# Patient Record
Sex: MALE | Race: WHITE
[De-identification: names, ages, dates, MRNs, and addresses within clinical notes are randomized per-mention and may not be internally consistent; named-entity substitution may affect disease eponyms.]

---

## 2019-02-20 ENCOUNTER — HOSPITAL ENCOUNTER (OUTPATIENT)
Dept: HOSPITAL 92 - SDC | Age: 34
Discharge: HOME | End: 2019-02-20
Attending: ORTHOPAEDIC SURGERY
Payer: COMMERCIAL

## 2019-02-20 VITALS — BODY MASS INDEX: 21.9 KG/M2

## 2019-02-20 DIAGNOSIS — M24.541: ICD-10-CM

## 2019-02-20 DIAGNOSIS — T84.84XA: Primary | ICD-10-CM

## 2019-02-20 DIAGNOSIS — M79.2: ICD-10-CM

## 2019-02-20 LAB
BASOPHILS # BLD AUTO: 0.1 THOU/UL (ref 0–0.2)
BASOPHILS NFR BLD AUTO: 1.4 % (ref 0–1)
EOSINOPHIL # BLD AUTO: 0.1 THOU/UL (ref 0–0.7)
EOSINOPHIL NFR BLD AUTO: 1.7 % (ref 0–10)
HGB BLD-MCNC: 15.2 G/DL (ref 14–18)
LYMPHOCYTES # BLD: 1.9 THOU/UL (ref 1.2–3.4)
LYMPHOCYTES NFR BLD AUTO: 37.7 % (ref 21–51)
MCH RBC QN AUTO: 31.9 PG (ref 27–31)
MCV RBC AUTO: 95.3 FL (ref 78–98)
MONOCYTES # BLD AUTO: 0.5 THOU/UL (ref 0.11–0.59)
MONOCYTES NFR BLD AUTO: 9.5 % (ref 0–10)
NEUTROPHILS # BLD AUTO: 2.5 THOU/UL (ref 1.4–6.5)
NEUTROPHILS NFR BLD AUTO: 49.8 % (ref 42–75)
PLATELET # BLD AUTO: 160 THOU/UL (ref 130–400)
RBC # BLD AUTO: 4.77 MILL/UL (ref 4.7–6.1)
WBC # BLD AUTO: 5 THOU/UL (ref 4.8–10.8)

## 2019-02-20 PROCEDURE — S0020 INJECTION, BUPIVICAINE HYDRO: HCPCS

## 2019-02-20 PROCEDURE — 76000 FLUOROSCOPY <1 HR PHYS/QHP: CPT

## 2019-02-20 PROCEDURE — 0RNWXZZ RELEASE RIGHT FINGER PHALANGEAL JOINT, EXTERNAL APPROACH: ICD-10-PCS | Performed by: ORTHOPAEDIC SURGERY

## 2019-02-20 PROCEDURE — 0RPW0JZ REMOVAL OF SYNTHETIC SUBSTITUTE FROM RIGHT FINGER PHALANGEAL JOINT, OPEN APPROACH: ICD-10-PCS | Performed by: ORTHOPAEDIC SURGERY

## 2019-02-20 PROCEDURE — 85652 RBC SED RATE AUTOMATED: CPT

## 2019-02-20 PROCEDURE — 85025 COMPLETE CBC W/AUTO DIFF WBC: CPT

## 2019-02-20 PROCEDURE — 36415 COLL VENOUS BLD VENIPUNCTURE: CPT

## 2019-02-20 NOTE — RAD
FLUOROSCOPIC IMAGES OF THE RIGHT RING FINGER:

2/20/19

 

INDICATION:

History of hardware removal. 

 

COMPARISON:  

Prior exam dated 11/16/18.

 

FINDINGS:  

Since the comparison study, there has been interval removal of the percutaneous pins involving the ri
ng finger proximal phalangeal neck fracture. Fracture still persists involving the ring finger proxim
al phalanx. The instrumented long finger proximal phalangeal base fracture is unchanged in position w
ith some interval healing. Dedicated right hand radiographs are recommended to evaluate the extent of
 healing.

 

IMPRESSION:  

Intraoperative C-arm images for removal of hardware from the right fourth digit proximal phalangeal f
racture. 

 

 

POS: INA

## 2019-02-21 NOTE — OP
DATE OF PROCEDURE:  02/20/2019



PREOPERATIVE DIAGNOSIS:  Right ring finger proximal phalanx neck subchondral

fracture, 3 months old, with painful now deep implant K-wires that are crossed. 



POSTOPERATIVE DIAGNOSES:  

1. Right ring finger proximal phalanx neck subchondral fracture, 3 months old, with

painful now deep implant K-wires that are crossed. 

2. Joint contracture.

3. No evidence of fracture; gross motion after wires removing, any plane and with

stretch. 



PROCEDURES PERFORMED:  

1. Manipulation of the joint from 110 degrees of flexion to -5 extension.

2. Removal of K-wires deep under general anesthesia using open incision.

3. C-arm supervision.



COMPLICATIONS:  None.



TOURNIQUET TIME:  8 minutes.



INDICATIONS:  The patient had a saw entered in both the long finger at the base of

the proximal phalanx and the ipsilateral right ring finger at the neck of the

proximal phalanx near the PIP joint. We then in order to maximize joint healing,

over reduced the joint part to make it anatomic approximately 0.5 mm wide for loss

of bone tissue. We crossed K-wires just because the fragment was fragile, and it has

now healed radiographically but, there is fear now that the joint is contracted with

loss of active flexion, and that if we remove the wires, the intra-articular portion

may not be healed and the fracture may move. Thus, a manipulation is indicated for

two reasons and the wire for one. 



DESCRIPTION OF PROCEDURE:  After successful general LMA technique, the limb was

prepped and draped. Time-out was done appropriately. We then gave the patient 10 mL

of 0.5% Marcaine block at metacarpophalangeal joint level. We then proceeded with

the limb exsanguination and tourniquet to 250 mmHg pressure was maximized. We

brought the C-arm to the field, identified the  __________. First, we made a 5-mm

incision, over dissected out both K-wires and removed them with a heavy needle

arreaga. We then manipulated the finger from -15 with passive PIP extension to -5

extension and from 70 degrees of flexion to 105 degrees PIP joint, and the fracture

remained stable under fluoro in both planes. 



Tourniquet was deflated. Hemostasis was obtained. Closed the wound with interrupted

4-0 nylon simple pattern, and the patient left the operating room without evidence

of anesthetic or operative complication. 







Job ID:  [id number]